# Patient Record
(demographics unavailable — no encounter records)

---

## 2024-10-31 NOTE — PLAN
[FreeTextEntry1] : 80 year old female presents for routine gyn exam - PMH atrophy, HTN, IBS, hypothyroid, osteoporosis, arthritis, goiter, paraovarian cyst, fibroid. SHx of breast bx, knee replacement, and hysteroscopy. BSE taught Breast and pelvic exam performed 04/24 mammogram and breast sonogram. Rx given, due 04/25. 08/23 colonoscopy  Osteopenia: 04/24 DEXA bone density. Due 04/26. Currently on Prolia, advised to f/u with endo  Fibroid, paraovarian cyst: 10/24 Pelvic sono today shows 1.6mm EML, no fluid in CDS, stable R paraovarian cyst, stable fibroid. Repeat 10/25.  Atrophy: Advised to use Revaree plus internally Apply Balmex/Aquaphor   RTO in 1 year or PRN

## 2024-10-31 NOTE — PHYSICAL EXAM
[Chaperone Present] : A chaperone was present in the examining room during all aspects of the physical examination [12624] : A chaperone was present during the pelvic exam. [Appropriately responsive] : appropriately responsive [Alert] : alert [No Acute Distress] : no acute distress [No Lymphadenopathy] : no lymphadenopathy [Regular Rate Rhythm] : regular rate rhythm [No Murmurs] : no murmurs [Clear to Auscultation B/L] : clear to auscultation bilaterally [Soft] : soft [Non-tender] : non-tender [Non-distended] : non-distended [No HSM] : No HSM [No Lesions] : no lesions [No Mass] : no mass [Oriented x3] : oriented x3 [Examination Of The Breasts] : a normal appearance [No Masses] : no breast masses were palpable [Vulvar Atrophy] : vulvar atrophy [Labia Majora] : normal [Labia Minora] : normal [Atrophy] : atrophy [Normal] : normal [Uterine Adnexae] : normal [FreeTextEntry2] : L labia majora- small hemangioma noted [FreeTextEntry9] : no masses, guaiac negative

## 2025-03-10 NOTE — HISTORY OF PRESENT ILLNESS
[FreeTextEntry1] : Presents in scheduled follow-up accompanied by her .  She has been feeling well with the exception of knee pain.  Her most significant issue remains still active wheezing.  ADL are well-tolerated, but limited by knee pain.  No other specific effort provoked symptoms.  Recently developed joint pain in concert with the right neck and suprascapular rash.  Saw his dermatologist 3 weeks after onset and testing for shingles was negative, although she was treated short-term with an antiviral.  No c/o chest, throat,jaw, arm or upper back discomfort.  No dyspnea, orthopnea or PND.  No palpitations, dizziness or syncope.  No edema or claudication.  Submits home blood pressure log.  Readings will vary to 107 and 143/66-77.

## 2025-03-10 NOTE — REASON FOR VISIT
[Hyperlipidemia] : hyperlipidemia [Hypertension] : hypertension [Palpitations] : palpitations [FreeTextEntry1] : Pre-operative assessment